# Patient Record
(demographics unavailable — no encounter records)

---

## 2024-10-09 NOTE — RESULTS/DATA
[FreeTextEntry1] :  PET scan on 10/3/23  1. Stable post therapy changes in the right breast with no suspicious tissue that is concerning for locally recurrent FDG avid breast malignancy. No hypermetabolic nodes. 2. Stable left upper lobe nodule. 3. No FDG avid malignant tissue in the remaining field of view. 7/6 /21CT c/ap showed stable 4 mm left apical lung nodule.Stable appearance of the right breast.  1/7/21 PET:  1. Further minimally decreased size of peripherally FDG avid fluid collection right breast. Unchanged diffuse parenchymal infiltration and right breast skin thickening. 2. Unchanged minimally FDG avid subcentimeter left upper lobe pulmonary nodule. 3. No new FDG avid disease.  9/2/20 CT:  6 mm pulmonary nodule in the left upper lobe. Although it has not significantly changed in size, it appears more dense when compared with the prior study. Neoplastic nodule cannot be excluded.  Skin thickening and collection in the right breast. This was present on the prior study. Please correlate clinically.  4/29/20 PET:   1.  Further decreased size of peripherally FDG avid fluid collection right breast. Unchanged diffuse parenchymal infiltration and right breast skin thickening. 2.  Unchanged, minimally FDG avid left upper lobe pulmonary nodule.  Unchanged minimally FDG avid left upper lobe pulmonary nodule 0.5 cm (SUV 1.3). 3.  No new FDG avid disease.  12/16/19 CT C/A/P: Since PET/CT on September 10, 2019, decreased size of fluid collection in the right breast. Persistent cutaneous thickening and subcutaneous fat stranding involving the right breast. No evidence of metastatic disease in the chest, abdomen, or pelvis.  12/16/19 CT Neck: 1. Redemonstration of postsurgical changes along the right cheek without gross evidence of recurrent or residual disease.  2. Similar-appearing right frontal convexity enhancing extra-axial mass. Differential diagnosis includes meningioma versus dural based metastatic disease. Please note that the patient has a cardiac pacemaker which may or may not preclude MRI evaluation.  3. Unchanged appearing left upper lobe pulmonary nodule.  4. Previously noted soft tissue tissue irregularity within the left inferior ear lobe is not seen on the current study it has definitely decreased in size when compared with 10/11/2018.  9/10/19 PET/CT: Since PET/CT May 8, 2019:  1. Unchanged 0.5 cm left upper lobe pulmonary nodule.  2. Decreased postsurgical collection right breast. 3. No new FDG avid disease.  5/8/19 PET/CT:  1. Interval right breast lumpectomy. Mildly hypermetabolic subtle skin thickening, subcutaneous soft tissue stranding, and seroma in the left breast, likely post surgical. New mildly FDG avid soft tissue density in the right axilla, also probably postsurgical. Please correlate clinically or with follow-up study to assess for resolution. 2. No abnormal FDG activity soft tissue nodularity right cheek. 3. Unchanged minimally FDG avid 0.5 cm left upper lobe pulmonary nodule. 4. Right frontal extra-axial mass seen on contrast-enhanced CT on 1/29/08 is again not definitely identified.   2/19/19 PET:  HEAD/NECK: Non-FDG avid lesion right frontal convexity is not well seen without intravenous contrast. Soft tissue nodularity right cheek without appreciable FDG avidity, limited in evaluation due to streak artifact from extensive dental amalgam. No discrete FDG avid focus.  LUNGS: Unchanged minimally FDG avid 0.5 cm left upper lobe pulmonary nodule (SUV 1.2; image 97), previous SUV 1.2. 0.2 cm left lower lobe nodule seen on CT chest is not definitively identified.  1/29/19 CT Chest: Stable small solid nodules in the left upper and lower lobes when compared to previous exam   1/29/19 CT Brain and Neck: In comparison with prior, redemonstration of loss of soft tissue planes along the right buccal cheek and fat pad with thickening of he right parotid Stensen's duct, platysma, with tissue irregularity extending to the right parotid gland, although this may reflect posttreatment sequela, residual/recurrent tumor with peerineural tumoral and/or lymphatic spread is not excluded. There is a 3.2 x 1.6 x 3.5 cm, AP, TR, CC right frontal extra-axial mass, which could be better assessed using MRI.  Re-demonstration of soft tissue regularity of the left inferior ear lobe Re-demonstration of left apical nodule   10/30/18 PET/CT:  Minimally FDG avid soft tissue thickening in the right cheek  is obscured by artifact from dental amalgam, possibly postsurgical. Mass  in the right frontal convexity seen on CT is not well seen without  intravenous contrast and is not discretely FDG avid.2 followup, with minimal  FDG avidity (SUV 1.2) 0.2 cm right middle lobe nodule  identified on CT chest is not definitively seen.  10/11/18 CT Neck:  Postoperative change along the right cheek involves the skin and  subcutaneous fat. There is subtle asymmetric thickening of the right  parotid capsule.  There is asymmetric thickening of the left platysma. Of note is nodular  thickening of the platysma. While findings may be related to  postprocedural change, metastatic muscular deposits cannot be occluded.  Additionally, there is subtle nodularity of the skin overlying the right  neck. Please correlate clinically for post  treatment/postoperative change versus dermal lymphatic spread of disease.   There is no additional soft tissue neck mass.  Evaluation of neck nodes demonstrates nonspecific scattered subcentimeter  nodes along the bilateral neck.    There is redemonstration of a left apical pulmonary nodule which allowing  for differences in technique is unchanged measuring approximately 0.5 cm. There is incomplete visualization of a mass over the right frontal  convexity is largest measurement are due to 0.8 x 1.2 cm. The possibility  of a dural based mass versus metastatic bone lesion should be considered.  Nonspecific low density along the the left inferior lobe measuring 1.3 x  1.4 x 0.8 cm may represent a keloid keloid versus vascular malformation.  There are no suspicious osteolytic or blastic lesions.  9/12/18 Pathology: Skin and soft tissue, right cheek, excision. Scar and surgical site changes, true margin is free.  No Merkel cell carcinoma seen. Additional 3 o'clock margin, right cheek, excision. Scar and surgical site changes, true margin is free.  No Merkel cell carcinoma seen. Additional 6 o'clock margin, right cheek, excision.  Granulomatous inflammation, consistent with surgical site changes, true margin is free.  No Merkel cell carcinoma seen.  8/30/18 Pathology: Right cheek, punch biopsy: Merkel cell carcinoma.  At least 3.4 mm in depth extending to the base of the biopsy Right cheek, punch biopsy:  Merkel cell carcinoma. At least 4.8 mm in depth extending to the base of the biopsy The submitted immunostained slides show that the neoplastic cells are positive for CK20 and synaptophysin, and negative for TTF1, in both specimens, in supportive of the diagnoses.  8/28/18 Pathology:   Skin and soft tissue right cheek, excision:  Merkel cell carcinoma. 1.7 cm in largest dimension, involving dermis and subcutis. 3 mm from the closest deep margin.  Vascular invasion seen, multifocal, present at inferior margin. Biopsy site changes. Deep margin right cheek, excision:  Mature adipose tissue, negative for malignancy Synoptic Summary (Merkel Cell Carcinoma) Procedure: Excision Laterality and tumor site: Right cheek Tumor size: 1.5 cm Margins: 3 mm from the closest deep margin Lymphovascular invasion: Present, multifocal, involving inferior margin Tumor extension: To subcutis Lymph nodes: NA Pathologic Staging:  Primary tumor: pT1.  Regional lymph nodes: pNX.  Distant metastasis: NA  8/28/18 CT C/A/P:  Patent central airways. 5 mm left upper lobe  pulmonary nodule. 2 mm right middle lobe nodule, possibly calcified.  No evidence of metastatic disease in the abdomen or pelvis.  8/8/18 Pathology: Right cheek:  Merkel cell carcinoma.  The lesion extends to the deep and lateral margins.    5/29/18 CT Brain:  2.5 x 0.8 cm right frontal extra-axial isodensity possible meningioma recommend MRI.   Review is made of the CT head of March 13, 2017.  The approximately 2.5 x 0.8 cm right frontal convexity extra-axial structure seen on the present study is also seen on the study of March 13, 2017. It is slightly increased in size compared to that study,  when it measured up to approximately 7 mm in greatest width when measured on coronal reformats. Findings are most consistent with extra-axial mass such as meningioma.

## 2024-10-09 NOTE — HISTORY OF PRESENT ILLNESS
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  She presented to dermatology, Dr. Gabriele Romero, with a lesion that started as a "pimple sized growth" on her right cheek that grew rapidly since June.  He performed a punch biopsy c/w Merkel cell carcinoma.  She was referred to Dr. Osmin Fermin.  Due to the size of the lesion and it's rapid progression, Dr. Fermin performed an excision on 8/28/18.  Pathology confirmed Merkel cell, 1.7 cm in largest dimension, involving dermis and subcutis, 3 mm from the closest deep margin.  Vascular invasion was seen, multifocal, present at inferior margin.  She next saw Dr. Carlos Manuel Galeas for reconstruction of the right cheek wound, which was done on 9/12/18.  09-12-18 she had a wider excision of the inferior margin, along with definitive closure by Dr. Galeas.  Final pathology from the second excision demonstrated no residual tumor.  The patient has already seen Dr. Dewey Howard from radiation therapy schedule for simulation and adjuvant treatment.  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 She is s/p right lumpectomy with Dr. Rosen and RT with Dr. Luis. She was started  on letrozole by an outside medical oncologist. + Right jaw numbness since RT. + Vertigo, chronic, unchanged. + Right ear hearing loss. No dysphagia, no odynophagia.  No cough.  No fevers, no chills, no night sweats.  No N/V/C/D.  No headaches, no visual changes.  No mouth sores. No new weight loss. No other complaints today.  7/29/21: pt is here for follow up. She had interval CT c/ap on 7/6/21 that showed stable 4 mm left apical lung nodule.Stable appearance of the right breast.  1/13/22: PET scan 12/8/21 Compared to Whole body FDG-PET/CT scan dated 1/7/2021: Nonspecific new punctate focus in the left hepatic lobe without CT correlate. Further evaluated with MRI or contrast-enhanced CT to exclude any abnormality in this region. 2. Fluid collection right breast with minimal peripheral FDG avidity, not significantly changed. Unchanged diffuse parenchymal infiltration and right breast skin thickening. 3. Stable minimally FDG avid subcentimeter left upper lobe pulmonary nodule.  Pt just lost her son to bone cancer and she is grieving. Doing well otherwise. Denies HA. CP, SOB, abd pain, constipation, diarrhea, melena, hematuria, dysuria.   1/28/22: pt had CT a/p showed no liver abnormality noted on PET scan. Pt is still mourning her son who passed away recently. She is going to have sinus cleaning for sinus infection on 2/1. She feels well otherwise. Denies HA. CP, SOB, abd pain, constipation, diarrhea, melena, hematuria, dysuria.   CT 4/13/22 showed interval development of a small left pleural effusion. Stable 6 mm nodule in the left upper lobe.Grossly stable changes in the right breast as above.   4/21/22: Pt is scheduled to have sinus, right jaw surgery due to RT to the right cheek after Merkel cell carcinoma resection.   7/12/22: Pt had sinus and right jaw sx. Recovered well. CT scans 7/13 showed stable exam. 6 mm left upper lobe pulmonary nodule in right breast postsurgical changes are unchanged. Denies HA. CP, SOB, abd pain, constipation, diarrhea, melena, hematuria, dysuria.  Pt is going to visit her grandson in [place] in Sept.   11/23/22: Jeanine is here for follow up for Merklel cell carcinoma s/p resection. She visited her nephew in [place], travelled to a few states with her friends. She had a good time. Has numbness of her fingers from carpel tunnel. Feels well otherwise.  She had interval PET scan on 11/18/22 that showed1. Interval resolution of nonspecific punctate focus in the left hepatic lobe. 2. Fluid collection right breast with minimal peripheral FDG avidity, diffuse parenchymal infiltration and right breast thickening, unchanged. 3. Stable minimally FDG avid subcentimeter left upper lobe pulmonary nodule.  3/3/23: Patient presents for a followup (transfer of care from Dr. Russo) Reports carpal tunnel release in October 2022 Remains active Patient doing well Follows ortho for back pain  04/04/2023 Returns for follow up visit. Reports stye in L eye, took abx, resolving. Reports f/u with dermatologist, Dr. Blancas and with Breast Surgeon, Dr. Rosen.     10/9/23: apetint here for f/u  She feels well denies any complainst Yogi is getting  in [facility] in aug 2024 and she wants to go for the wedding   PET scan on 10/3/23  1. Stable post therapy changes in the right breast with no suspicious tissue that is concerning for locally recurrent FDG avid breast malignancy. No hypermetabolic nodes. 2. Stable left upper lobe nodule. 3. No FDG avid malignant tissue in the remaining field of view.  4/10/24: Patient presents for follow up   Patient feels well  3/21/24 CT CAP IMPRESSION: No evidence of progression of disease within the chest, abdomen, or pelvis. Postsurgical changes within the right breast again noted as well as right breast skin thickening. Unchanged 6 mm left upper lobe pulmonary nodule since at least 7/13/2022, favoring benignity. There appears to be subtle concentric wall thickening of the mid ascending colon, which could be due to an underlying colonic mass. Further evaluation with colonoscopy is suggested if not recently performed and clinically appropriate for this patient. Additional chronic and incidental findings are present as detailed above.  10/9/24: Patient presents for follow up Reports feeling well overall Per patient, saw GI Dr. Mcneil, had recent colonoscopy which was unremarkable Still sees DERM every 5-6 months  8/15/24 CT CAP - Unchanged left upper lobe nodule. The ascending colon is nondistended/collapsed, limiting evaluation for wall thickening noted on prior study.

## 2024-10-09 NOTE — HISTORY OF PRESENT ILLNESS
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  She presented to dermatology, Dr. Gabriele Romero, with a lesion that started as a "pimple sized growth" on her right cheek that grew rapidly since June.  He performed a punch biopsy c/w Merkel cell carcinoma.  She was referred to Dr. Osmin Fermin.  Due to the size of the lesion and it's rapid progression, Dr. Fermin performed an excision on 8/28/18.  Pathology confirmed Merkel cell, 1.7 cm in largest dimension, involving dermis and subcutis, 3 mm from the closest deep margin.  Vascular invasion was seen, multifocal, present at inferior margin.  She next saw Dr. Carlos Manuel Galeas for reconstruction of the right cheek wound, which was done on 9/12/18.  09-12-18 she had a wider excision of the inferior margin, along with definitive closure by Dr. Galeas.  Final pathology from the second excision demonstrated no residual tumor.  The patient has already seen Dr. Dewey Howard from radiation therapy schedule for simulation and adjuvant treatment.  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 She is s/p right lumpectomy with Dr. Rosen and RT with Dr. Luis. She was started  on letrozole by an outside medical oncologist. + Right jaw numbness since RT. + Vertigo, chronic, unchanged. + Right ear hearing loss. No dysphagia, no odynophagia.  No cough.  No fevers, no chills, no night sweats.  No N/V/C/D.  No headaches, no visual changes.  No mouth sores. No new weight loss. No other complaints today.  7/29/21: pt is here for follow up. She had interval CT c/ap on 7/6/21 that showed stable 4 mm left apical lung nodule.Stable appearance of the right breast.  1/13/22: PET scan 12/8/21 Compared to Whole body FDG-PET/CT scan dated 1/7/2021: Nonspecific new punctate focus in the left hepatic lobe without CT correlate. Further evaluated with MRI or contrast-enhanced CT to exclude any abnormality in this region. 2. Fluid collection right breast with minimal peripheral FDG avidity, not significantly changed. Unchanged diffuse parenchymal infiltration and right breast skin thickening. 3. Stable minimally FDG avid subcentimeter left upper lobe pulmonary nodule.  Pt just lost her son to bone cancer and she is grieving. Doing well otherwise. Denies HA. CP, SOB, abd pain, constipation, diarrhea, melena, hematuria, dysuria.   1/28/22: pt had CT a/p showed no liver abnormality noted on PET scan. Pt is still mourning her son who passed away recently. She is going to have sinus cleaning for sinus infection on 2/1. She feels well otherwise. Denies HA. CP, SOB, abd pain, constipation, diarrhea, melena, hematuria, dysuria.   CT 4/13/22 showed interval development of a small left pleural effusion. Stable 6 mm nodule in the left upper lobe.Grossly stable changes in the right breast as above.   4/21/22: Pt is scheduled to have sinus, right jaw surgery due to RT to the right cheek after Merkel cell carcinoma resection.   7/12/22: Pt had sinus and right jaw sx. Recovered well. CT scans 7/13 showed stable exam. 6 mm left upper lobe pulmonary nodule in right breast postsurgical changes are unchanged. Denies HA. CP, SOB, abd pain, constipation, diarrhea, melena, hematuria, dysuria.  Pt is going to visit her grandson in [place] in Sept.   11/23/22: Jeanine is here for follow up for Merklel cell carcinoma s/p resection. She visited her nephew in [place], travelled to a few states with her friends. She had a good time. Has numbness of her fingers from carpel tunnel. Feels well otherwise.  She had interval PET scan on 11/18/22 that showed1. Interval resolution of nonspecific punctate focus in the left hepatic lobe. 2. Fluid collection right breast with minimal peripheral FDG avidity, diffuse parenchymal infiltration and right breast thickening, unchanged. 3. Stable minimally FDG avid subcentimeter left upper lobe pulmonary nodule.  3/3/23: Patient presents for a followup (transfer of care from Dr. Russo) Reports carpal tunnel release in October 2022 Remains active Patient doing well Follows ortho for back pain  04/04/2023 Returns for follow up visit. Reports stye in L eye, took abx, resolving. Reports f/u with dermatologist, Dr. Blancas and with Breast Surgeon, Dr. Rosen.     10/9/23: apetint here for f/u  She feels well denies any complainst Yogi is getting  in [facility] in aug 2024 and she wants to go for the wedding   PET scan on 10/3/23  1. Stable post therapy changes in the right breast with no suspicious tissue that is concerning for locally recurrent FDG avid breast malignancy. No hypermetabolic nodes. 2. Stable left upper lobe nodule. 3. No FDG avid malignant tissue in the remaining field of view.  4/10/24: Patient presents for follow up   Patient feels well  3/21/24 CT CAP IMPRESSION: No evidence of progression of disease within the chest, abdomen, or pelvis. Postsurgical changes within the right breast again noted as well as right breast skin thickening. Unchanged 6 mm left upper lobe pulmonary nodule since at least 7/13/2022, favoring benignity. There appears to be subtle concentric wall thickening of the mid ascending colon, which could be due to an underlying colonic mass. Further evaluation with colonoscopy is suggested if not recently performed and clinically appropriate for this patient. Additional chronic and incidental findings are present as detailed above.  10/9/24: Patient presents for follow up Reports feeling well overall Per patient, saw GI Dr. Mcneil, had recent colonoscopy which was unremarkable Still sees DERM every 5-6 months  8/15/24 CT CAP - Unchanged left upper lobe nodule. The ascending colon is nondistended/collapsed, limiting evaluation for wall thickening noted on prior study.

## 2024-10-09 NOTE — PHYSICAL EXAM
[Normal] : affect appropriate [de-identified] : 22 cm scar which arcs from right inferior ear, over right cheek, and down right neck.  [de-identified] : following with Dr. Tran and Dr. Luis [de-identified] : raised erythematous 1 cm lesion upper right nose

## 2024-10-09 NOTE — PHYSICAL EXAM
[Normal] : affect appropriate [de-identified] : 22 cm scar which arcs from right inferior ear, over right cheek, and down right neck.  [de-identified] : following with Dr. Tran and Dr. Luis [de-identified] : raised erythematous 1 cm lesion upper right nose

## 2024-10-09 NOTE — ASSESSMENT
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 Fermin -s/p definitive surgical resection with no obvious gross malignant disease with Dr. Fermin.  -s/p RT with Dr. Howard.  -She had interval PET scan on 11/18/22 that showed1. Interval resolution of nonspecific punctate focus in the left hepatic lobe. 2. Fluid collection right breast with minimal peripheral FDG avidity, diffuse parenchymal infiltration and right breast thickening, unchanged. 3. Stable minimally FDG avid subcentimeter left upper lobe pulmonary nodule.  S/p right lumpectomy with Dr. Rosen and RT with Dr. Luis, followed by treatment with letrozole by Dr. Philippe for Stage I [T2bNx] ER+ CT+ HER2- right sided breast cancer  3/3/23: WBC 6.7, HGB 14.4, HCT 42.2,  - 3/27/23 CT CAP IMPRESSION: Stable examination compared with July 13, 2022. No change in subcentimeter left upper lobe pulmonary nodule. No other suspicious suspected sites of metastatic disease identified. -Follow up with Breast Surgeon, Dr. Rosen -Following with dermatologist Dr. Blancas routinely for skin screening q 6 months -Given high-risk Merkel cell carcinoma 4-6 months screening scans. Will alternate between CT N/C/A/P and PET/CT.  - Recent PET scan in OCT 2023 stable  - Reviewed recent scan on 3/21/24, advised to f/u with GI to have colonoscopy to further assess colon wall thickening, per patient followed with GI Dr. Mcneil, s/p colonoscopy which was unremarkable - 8/15/24 CT CAP - Unchanged left upper lobe nodule. The ascending colon is nondistended/collapsed, limiting evaluation for wall thickening noted on prior study. - It has been 6 years since initial event, can obtain scans less frequently now, next in April 2025 - CBC, CMP, LDH ordered today - F/U with me after scans

## 2024-10-09 NOTE — ASSESSMENT
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 Fermin -s/p definitive surgical resection with no obvious gross malignant disease with Dr. Fermin.  -s/p RT with Dr. Howard.  -She had interval PET scan on 11/18/22 that showed1. Interval resolution of nonspecific punctate focus in the left hepatic lobe. 2. Fluid collection right breast with minimal peripheral FDG avidity, diffuse parenchymal infiltration and right breast thickening, unchanged. 3. Stable minimally FDG avid subcentimeter left upper lobe pulmonary nodule.  S/p right lumpectomy with Dr. Rosen and RT with Dr. Luis, followed by treatment with letrozole by Dr. Philippe for Stage I [T2bNx] ER+ TN+ HER2- right sided breast cancer  3/3/23: WBC 6.7, HGB 14.4, HCT 42.2,  - 3/27/23 CT CAP IMPRESSION: Stable examination compared with July 13, 2022. No change in subcentimeter left upper lobe pulmonary nodule. No other suspicious suspected sites of metastatic disease identified. -Follow up with Breast Surgeon, Dr. Rosen -Following with dermatologist Dr. Blancas routinely for skin screening q 6 months -Given high-risk Merkel cell carcinoma 4-6 months screening scans. Will alternate between CT N/C/A/P and PET/CT.  - Recent PET scan in OCT 2023 stable  - Reviewed recent scan on 3/21/24, advised to f/u with GI to have colonoscopy to further assess colon wall thickening, per patient followed with GI Dr. Mcneil, s/p colonoscopy which was unremarkable - 8/15/24 CT CAP - Unchanged left upper lobe nodule. The ascending colon is nondistended/collapsed, limiting evaluation for wall thickening noted on prior study. - It has been 6 years since initial event, can obtain scans less frequently now, next in April 2025 - CBC, CMP, LDH ordered today - F/U with me after scans

## 2024-10-09 NOTE — ADDENDUM
[FreeTextEntry1] :  Documented by Deena Rios acting as scribe for Dr. Hope on  10/09/2024.   All Medical record entries made by the Scribe were at my, Dr. Hope's, direction and personally dictated by me on  10/09/2024. I have reviewed the chart and agree that the record accurately reflects my personal performance of the history, physical exam, assessment and plan. I have also personally directed, reviewed, and agreed with the discharge instructions.

## 2024-10-09 NOTE — BEGINNING OF VISIT
[PHQ-2 Negative] : PHQ-2 Negative [PHQ-9 Deferred] : PHQ-9 Deferred [Advised Primary Care Follow-up] : Advised Primary Care Follow-up  [Pain Scale: ___] : On a scale of 1-10, today the patient's pain is a(n) [unfilled]. [Former] : Former [Date Discussed (MM/DD/YY): ___] : Discussed: [unfilled] [With Patient/Caregiver] : with Patient/Caregiver